# Patient Record
Sex: MALE | Race: WHITE | URBAN - METROPOLITAN AREA
[De-identification: names, ages, dates, MRNs, and addresses within clinical notes are randomized per-mention and may not be internally consistent; named-entity substitution may affect disease eponyms.]

---

## 2020-09-08 ENCOUNTER — LAB OUTSIDE AN ENCOUNTER (OUTPATIENT)
Dept: URBAN - METROPOLITAN AREA CLINIC 121 | Facility: CLINIC | Age: 35
End: 2020-09-08

## 2020-09-09 ENCOUNTER — OFFICE VISIT (OUTPATIENT)
Dept: URBAN - METROPOLITAN AREA CLINIC 63 | Facility: CLINIC | Age: 35
End: 2020-09-09

## 2020-09-16 LAB
CALPROTECTIN, STOOL: (no result)
CAMPYLOBACTER, CULTURE: (no result)
CLOSTRIDIUM DIFFICILE TOXIN/GDH W/REFL TO: (no result)
FECAL LEUKOCYTE STAIN: (no result)
GIARDIA AG, EIA, STOOL: (no result)
LACTOFERRIN, QN, STOOL: (no result)
OVA AND PARASITES, CONC AND PERM SMEAR: (no result)
SALMONELLA AND SHIGELLA, CULTURE: (no result)
SHIGA TOXINS, EIA W/RFL TO E.COLI O157: (no result)

## 2020-10-09 ENCOUNTER — OFFICE VISIT (OUTPATIENT)
Dept: URBAN - METROPOLITAN AREA CLINIC 63 | Facility: CLINIC | Age: 35
End: 2020-10-09

## 2020-12-10 ENCOUNTER — OFFICE VISIT (OUTPATIENT)
Dept: URBAN - METROPOLITAN AREA CLINIC 60 | Facility: CLINIC | Age: 35
End: 2020-12-10

## 2021-02-10 ENCOUNTER — LAB OUTSIDE AN ENCOUNTER (OUTPATIENT)
Dept: URBAN - METROPOLITAN AREA CLINIC 121 | Facility: CLINIC | Age: 36
End: 2021-02-10

## 2021-02-13 LAB — MAGNESIUM: (no result)

## 2021-02-15 LAB
ABSOLUTE BASOPHILS: (no result)
ABSOLUTE EOSINOPHILS: (no result)
ABSOLUTE LYMPHOCYTES: (no result)
ABSOLUTE MONOCYTES: (no result)
ABSOLUTE NEUTROPHILS: (no result)
ALBUMIN/GLOBULIN RATIO: (no result)
ALBUMIN: (no result)
ALKALINE PHOSPHATASE: (no result)
ALT: (no result)
AST: (no result)
BASOPHILS: (no result)
BILIRUBIN, TOTAL: (no result)
BUN/CREATININE RATIO: (no result)
C-REACTIVE PROTEIN: (no result)
CALCIUM: (no result)
CARBON DIOXIDE: (no result)
CHLORIDE: (no result)
CREATININE: (no result)
EGFR AFRICAN AMERICA: (no result)
EGFR NON-AFR. AMERICAN: (no result)
EOSINOPHILS: (no result)
GLOBULIN: (no result)
GLUCOSE: (no result)
HEMATOCRIT: (no result)
HEMOGLOBIN: (no result)
LYMPHOCYTES: (no result)
MCH: (no result)
MCHC: (no result)
MCV: (no result)
MONOCYTES: (no result)
MPV: (no result)
NEUTROPHILS: (no result)
PLATELET COUNT: (no result)
POTASSIUM: (no result)
PROTEIN, TOTAL: (no result)
RDW: (no result)
RED BLOOD CELL COUNT: (no result)
SODIUM: (no result)
UREA NITROGEN (BUN): (no result)
WHITE BLOOD CELL COUNT: (no result)

## 2021-02-18 ENCOUNTER — LAB OUTSIDE AN ENCOUNTER (OUTPATIENT)
Dept: URBAN - METROPOLITAN AREA CLINIC 121 | Facility: CLINIC | Age: 36
End: 2021-02-18

## 2021-02-24 LAB — CALPROTECTIN, STOOL: (no result)

## 2021-07-19 ENCOUNTER — LAB OUTSIDE AN ENCOUNTER (OUTPATIENT)
Dept: URBAN - METROPOLITAN AREA CLINIC 121 | Facility: CLINIC | Age: 36
End: 2021-07-19

## 2021-07-20 ENCOUNTER — LAB OUTSIDE AN ENCOUNTER (OUTPATIENT)
Dept: URBAN - METROPOLITAN AREA CLINIC 121 | Facility: CLINIC | Age: 36
End: 2021-07-20

## 2021-07-20 LAB — FECAL GLOBIN BY IMMUNOCHEMISTRY: (no result)

## 2021-07-23 LAB
CAMPYLOBACTER, CULTURE: (no result)
FECAL LEUKOCYTE STAIN: (no result)
GIARDIA AG, EIA, STOOL: (no result)
OVA AND PARASITES, CONC AND PERM SMEAR: (no result)
SALMONELLA AND SHIGELLA, CULTURE: (no result)
SHIGA TOXINS, EIA W/RFL TO E.COLI O157: (no result)

## 2021-07-26 LAB
CALPROTECTIN, STOOL: (no result)
CLOSTRIDIUM DIFFICILE TOXIN/GDH W/REFL TO: (no result)
LACTOFERRIN, QN, STOOL: (no result)

## 2021-07-27 LAB
ABSOLUTE BASOPHILS: (no result)
ABSOLUTE EOSINOPHILS: (no result)
ABSOLUTE LYMPHOCYTES: (no result)
ABSOLUTE MONOCYTES: (no result)
ABSOLUTE NEUTROPHILS: (no result)
ADALIMUMAB AB, IBD: (no result)
ADALIMUMAB LEVEL, IBD: (no result)
ALBUMIN/GLOBULIN RATIO: (no result)
ALBUMIN: (no result)
ALKALINE PHOSPHATASE: (no result)
ALT: (no result)
AST: (no result)
BASOPHILS: (no result)
BILIRUBIN, TOTAL: (no result)
BUN/CREATININE RATIO: (no result)
C-REACTIVE PROTEIN: (no result)
CALCIUM: (no result)
CARBON DIOXIDE: (no result)
CHLORIDE: (no result)
COMMENT: (no result)
CREATININE: (no result)
EGFR AFRICAN AMERIC: (no result)
EGFR NON-AFR. AMERI: (no result)
EOSINOPHILS: (no result)
GLOBULIN: (no result)
GLUCOSE: (no result)
HEMATOCRIT: (no result)
HEMOGLOBIN: (no result)
HEPATITIS B SURFACE ANTIGEN: (no result)
INTERPRETATION: (no result)
LYMPHOCYTES: (no result)
MCH: (no result)
MCHC: (no result)
MCV: (no result)
MITOGEN-NIL: (no result)
MONOCYTES: (no result)
MPV: (no result)
NEUTROPHILS: (no result)
NIL: (no result)
PLATELET COUNT: (no result)
POTASSIUM: (no result)
PROTEIN, TOTAL: (no result)
QUANTIFERON(R)-TB GOLD PLUS, 1 TUBE: NEGATIVE
RDW: (no result)
RED BLOOD CELL COUNT: (no result)
SODIUM: (no result)
TB1-NIL: (no result)
TB2-NIL: (no result)
UREA NITROGEN (BUN): (no result)
WHITE BLOOD CELL COUNT: (no result)

## 2021-08-11 ENCOUNTER — OFFICE VISIT (OUTPATIENT)
Dept: URBAN - METROPOLITAN AREA CLINIC 60 | Facility: CLINIC | Age: 36
End: 2021-08-11

## 2021-09-01 ENCOUNTER — OFFICE VISIT (OUTPATIENT)
Dept: URBAN - METROPOLITAN AREA CLINIC 60 | Facility: CLINIC | Age: 36
End: 2021-09-01

## 2021-09-29 ENCOUNTER — OFFICE VISIT (OUTPATIENT)
Dept: URBAN - METROPOLITAN AREA CLINIC 60 | Facility: CLINIC | Age: 36
End: 2021-09-29

## 2021-11-18 ENCOUNTER — OFFICE VISIT (OUTPATIENT)
Dept: URBAN - METROPOLITAN AREA CLINIC 60 | Facility: CLINIC | Age: 36
End: 2021-11-18

## 2021-12-01 ENCOUNTER — OFFICE VISIT (OUTPATIENT)
Dept: URBAN - METROPOLITAN AREA CLINIC 60 | Facility: CLINIC | Age: 36
End: 2021-12-01

## 2021-12-09 LAB
6 MMP: (no result)
6 TG: (no result)
ABSOLUTE BASOPHILS: (no result)
ABSOLUTE EOSINOPHILS: (no result)
ABSOLUTE LYMPHOCYTES: (no result)
ABSOLUTE MONOCYTES: (no result)
ABSOLUTE NEUTROPHILS: (no result)
ALBUMIN/GLOBULIN RATIO: (no result)
ALBUMIN: (no result)
ALKALINE PHOSPHATASE: (no result)
ALT: (no result)
AST: (no result)
BASOPHILS: (no result)
BILIRUBIN, TOTAL: (no result)
BUN/CREATININE RATIO: (no result)
C-REACTIVE PROTEIN: (no result)
CALCIUM: (no result)
CARBON DIOXIDE: (no result)
CHLORIDE: (no result)
COMMENT: (no result)
CREATININE: (no result)
EGFR AFRICAN AMERIC: (no result)
EGFR NON-AFR. AMERI: (no result)
EOSINOPHILS: (no result)
GLOBULIN: (no result)
GLUCOSE: (no result)
HEMATOCRIT: (no result)
HEMOGLOBIN: (no result)
INFLIXIMAB AB, IBD: (no result)
INFLIXIMAB LEVEL, IBD: (no result)
INTERPRETATION: (no result)
LYMPHOCYTES: (no result)
MCH: (no result)
MCHC: (no result)
MCV: (no result)
MONOCYTES: (no result)
MPV: (no result)
NEUTROPHILS: (no result)
PLATELET COUNT: (no result)
POTASSIUM: (no result)
PROTEIN, TOTAL: (no result)
RDW: (no result)
RED BLOOD CELL COUNT: (no result)
SODIUM: (no result)
UREA NITROGEN (BUN): (no result)
WHITE BLOOD CELL COUNT: (no result)

## 2022-02-02 ENCOUNTER — OFFICE VISIT (OUTPATIENT)
Dept: URBAN - METROPOLITAN AREA CLINIC 60 | Facility: CLINIC | Age: 37
End: 2022-02-02

## 2022-04-27 LAB
ABSOLUTE BASOPHILS: (no result)
ABSOLUTE EOSINOPHILS: (no result)
ABSOLUTE LYMPHOCYTES: (no result)
ABSOLUTE MONOCYTES: (no result)
ABSOLUTE NEUTROPHILS: (no result)
ALBUMIN/GLOBULIN RATIO: (no result)
ALBUMIN: (no result)
ALKALINE PHOSPHATASE: (no result)
ALT: (no result)
AST: (no result)
BASOPHILS: (no result)
BILIRUBIN, TOTAL: (no result)
BUN/CREATININE RATIO: (no result)
C-REACTIVE PROTEIN: (no result)
CALCIUM: (no result)
CARBON DIOXIDE: (no result)
CHLORIDE: (no result)
CREATININE: (no result)
EGFR AFRICAN AMERIC: (no result)
EGFR NON-AFR. AMERI: (no result)
EOSINOPHILS: (no result)
GLOBULIN: (no result)
GLUCOSE: (no result)
HEMATOCRIT: (no result)
HEMOGLOBIN: (no result)
LYMPHOCYTES: (no result)
MCH: (no result)
MCHC: (no result)
MCV: (no result)
MONOCYTES: (no result)
MPV: (no result)
NEUTROPHILS: (no result)
PLATELET COUNT: (no result)
POTASSIUM: (no result)
PROTEIN, TOTAL: (no result)
RDW: (no result)
RED BLOOD CELL COUNT: (no result)
SODIUM: (no result)
UREA NITROGEN (BUN): (no result)
VITAMIN D,25-OH,TOTAL,IA: (no result)
WHITE BLOOD CELL COUNT: (no result)

## 2022-07-09 ENCOUNTER — TELEPHONE ENCOUNTER (OUTPATIENT)
Dept: URBAN - METROPOLITAN AREA CLINIC 121 | Facility: CLINIC | Age: 37
End: 2022-07-09

## 2022-07-09 RX ORDER — AZATHIOPRINE 50 MG/1
TABLET ORAL TWICE A DAY
Refills: 0 | OUTPATIENT
Start: 2021-09-26 | End: 2021-09-29

## 2022-07-09 RX ORDER — ADALIMUMAB 40MG/0.4ML
INJECT 1-40MG PEN SC EVERY WEEK KIT SUBCUTANEOUS
Refills: 4 | OUTPATIENT
Start: 2018-10-26 | End: 2019-01-09

## 2022-07-09 RX ORDER — PREDNISONE 20 MG/1
TABLET ORAL
Refills: 0 | OUTPATIENT
Start: 2010-03-31 | End: 2011-07-27

## 2022-07-09 RX ORDER — ADALIMUMAB 40MG/0.8ML
SPOKE WITH CROMARTIE KIT SUBCUTANEOUS TAKE AS DIRECTED
Refills: 3 | OUTPATIENT
Start: 2017-11-28 | End: 2018-02-28

## 2022-07-09 RX ORDER — PREDNISONE 5 MG/1
25MG FOR 7 DAYS, 20MG FOR 7 DAYS, 15 MG FOR 7 DAYS, 10 MG FOR 7 DAYS TABLET ORAL
Refills: 0 | OUTPATIENT
Start: 2021-09-01 | End: 2021-09-29

## 2022-07-09 RX ORDER — ADALIMUMAB 40MG/0.4ML
INJECT 40 MG UNDER THE SKIN EVERY OTHER WEEK AS DIRECTED KIT SUBCUTANEOUS
Refills: 5 | OUTPATIENT
Start: 2021-07-27 | End: 2021-07-27

## 2022-07-09 RX ORDER — ADALIMUMAB 40MG/0.4ML
USE AS DIRECTED40 MG SC INJECTION EVERY OTHER WEEK KIT SUBCUTANEOUS
Refills: 0 | OUTPATIENT
Start: 2021-02-16 | End: 2021-03-31

## 2022-07-09 RX ORDER — AZATHIOPRINE 50 MG/1
2 ONCE A DAY TABLET ORAL
Refills: 0 | OUTPATIENT
Start: 2021-07-28 | End: 2021-08-11

## 2022-07-09 RX ORDER — AZATHIOPRINE 50 MG/1
2 ONCE A DAY TABLET ORAL
Refills: 3 | OUTPATIENT
Start: 2019-03-15 | End: 2019-07-15

## 2022-07-09 RX ORDER — ADALIMUMAB 40MG/0.4ML
INJECT 40 MG UNDER THE SKIN EVERY OTHER WEEK AS DIRECTED KIT SUBCUTANEOUS
Refills: 5 | OUTPATIENT
Start: 2021-07-23 | End: 2021-07-27

## 2022-07-09 RX ORDER — ADALIMUMAB 40MG/0.8ML
INJECT ONE 40MG/0.8ML PEN SYRINGE SC Q WEEK KIT SUBCUTANEOUS TAKE AS DIRECTED
Refills: 3 | OUTPATIENT
Start: 2019-07-05 | End: 2019-07-18

## 2022-07-09 RX ORDER — ADALIMUMAB 40MG/0.8ML
KIT SUBCUTANEOUS TAKE AS DIRECTED
Refills: 3 | OUTPATIENT
Start: 2018-02-28 | End: 2018-08-29

## 2022-07-09 RX ORDER — ADALIMUMAB 40MG/0.8ML
KIT SUBCUTANEOUS TAKE AS DIRECTED
Refills: 3 | OUTPATIENT
Start: 2017-11-16 | End: 2017-11-28

## 2022-07-09 RX ORDER — OSELTAMIVIR PHOSPHATE 75 MG
TWICE A DAY CAPSULE ORAL TWICE A DAY
Refills: 0 | OUTPATIENT
Start: 2018-01-17 | End: 2018-10-12

## 2022-07-09 RX ORDER — AZATHIOPRINE 50 MG/1
2 ONCE A DAY TABLET ORAL
Refills: 1 | OUTPATIENT
Start: 2021-09-29 | End: 2022-04-13

## 2022-07-09 RX ORDER — ADALIMUMAB 40MG/0.4ML
INJECT ONE 40MG/0.4ML PEN SC EVERY 14 DAYS KIT SUBCUTANEOUS TAKE AS DIRECTED
Refills: 5 | OUTPATIENT
Start: 2020-09-23 | End: 2021-02-16

## 2022-07-09 RX ORDER — ADALIMUMAB 40MG/0.4ML
INJECT ONE 40MG/0.4ML PEN SC EVERY 14 DAYS KIT SUBCUTANEOUS TAKE AS DIRECTED
Refills: 5 | OUTPATIENT
Start: 2020-05-04 | End: 2020-10-09

## 2022-07-09 RX ORDER — INFLIXIMAB 100 MG/10ML
INJECTION, POWDER, LYOPHILIZED, FOR SOLUTION INTRAVENOUS
Refills: 0 | OUTPATIENT
Start: 2011-07-27 | End: 2014-05-07

## 2022-07-09 RX ORDER — ADALIMUMAB 40MG/0.8ML
KIT SUBCUTANEOUS
Refills: 0 | OUTPATIENT
Start: 2016-04-13 | End: 2017-01-11

## 2022-07-09 RX ORDER — DICYCLOMINE HYDROCHLORIDE 20 MG/1
FOUR TIMES A DAY TABLET ORAL
Refills: 1 | OUTPATIENT
Start: 2020-09-08 | End: 2020-10-09

## 2022-07-09 RX ORDER — ADALIMUMAB 40MG/0.4ML
INJECT 1-40MG PEN SC EVERY 14 DAYS KIT SUBCUTANEOUS TAKE AS DIRECTED
Refills: 4 | OUTPATIENT
Start: 2018-08-30 | End: 2018-09-26

## 2022-07-09 RX ORDER — ONDANSETRON HYDROCHLORIDE 4 MG/1
THREE TIMES A DAY AS NEEDED FOR NAUSEA TABLET, FILM COATED ORAL THREE TIMES A DAY
Refills: 0 | OUTPATIENT
Start: 2020-09-08 | End: 2020-10-09

## 2022-07-09 RX ORDER — BUDESONIDE 9 MG/1
ONCE A DAY TABLET, EXTENDED RELEASE ORAL ONCE A DAY
Refills: 0 | OUTPATIENT
Start: 2018-12-18 | End: 2019-07-15

## 2022-07-09 RX ORDER — ADALIMUMAB 40MG/0.8ML
CALLED IN TO LEANNE AT PRIME THERAPEUTICS SPECIALTY KIT SUBCUTANEOUS TAKE AS DIRECTED
Refills: 3 | OUTPATIENT
Start: 2017-01-11 | End: 2017-11-16

## 2022-07-09 RX ORDER — ADALIMUMAB 40MG/0.4ML
INJECT ONE 40MG/0.4ML PEN SC Q 7 DAYS KIT SUBCUTANEOUS TAKE AS DIRECTED
Refills: 3 | OUTPATIENT
Start: 2019-07-18 | End: 2021-12-01

## 2022-07-09 RX ORDER — ADALIMUMAB 40MG/0.8ML
INJECT 1 PEN UNDER THE SKIN (SUBCUTANEOUS INJECTION) EVERY 2 WEEKS KIT SUBCUTANEOUS
Refills: 2 | OUTPATIENT
Start: 2018-08-29 | End: 2018-08-30

## 2022-07-09 RX ORDER — ADALIMUMAB 40MG/0.4ML
INJECT 1-40MG PEN SC EVERY WEEK KIT SUBCUTANEOUS
Refills: 4 | OUTPATIENT
Start: 2019-01-09 | End: 2021-12-01

## 2022-07-09 RX ORDER — ADALIMUMAB 40MG/0.4ML
INJECT 1-40MG PEN SC EVERY 14 DAYS KIT SUBCUTANEOUS TAKE AS DIRECTED
Refills: 4 | OUTPATIENT
Start: 2018-09-26 | End: 2018-10-26

## 2022-07-09 RX ORDER — PREDNISONE 10 MG/1
4 TAB/DAY FOR 14 DAYS, THEN 3 TAB//DAY FOR 14 DAYS TABLET ORAL
Refills: 0 | OUTPATIENT
Start: 2020-09-15 | End: 2020-12-10

## 2022-07-09 RX ORDER — PREDNISONE 5 MG/1
2 TABS FOR 14 DAYS, 1 AND 1/2  TAB FOR 7 DAYS, 1 TAB FOR 7 DAYS, 1/2 TABS 7 DAYS TABLET ORAL
Refills: 0 | OUTPATIENT
Start: 2021-09-29 | End: 2021-12-01

## 2022-07-09 RX ORDER — ADALIMUMAB 40MG/0.4ML
INJECT ONE 40MG/0.4ML PEN SC EVERY 14 DAYS KIT SUBCUTANEOUS TAKE AS DIRECTED
Refills: 5 | OUTPATIENT
Start: 2019-07-15 | End: 2019-07-15

## 2022-07-09 RX ORDER — ADALIMUMAB 40MG/0.4ML
INJECT 40 MG UNDER THE SKIN EVERY OTHER WEEK AS DIRECTED KIT SUBCUTANEOUS
Refills: 5 | OUTPATIENT
Start: 2021-06-15 | End: 2021-07-23

## 2022-07-09 RX ORDER — BUDESONIDE 9 MG/1
ONCE A DAY TABLET, EXTENDED RELEASE ORAL ONCE A DAY
Refills: 0 | OUTPATIENT
Start: 2018-10-24 | End: 2018-11-08

## 2022-07-09 RX ORDER — AZATHIOPRINE 50 MG/1
2 ONCE A DAY TABLET ORAL
Refills: 0 | OUTPATIENT
Start: 2021-08-11 | End: 2021-09-01

## 2022-07-09 RX ORDER — INFLIXIMAB 100 MG/10ML
INJECTION, POWDER, LYOPHILIZED, FOR SOLUTION INTRAVENOUS
Refills: 0 | OUTPATIENT
Start: 2014-05-07 | End: 2014-08-20

## 2022-07-09 RX ORDER — PREDNISONE 10 MG/1
TAKE 40 MG X 10D, 30MG X 10D, 20 MG X 10D, 10 MG X 10D THEN 1 TAB EOD UNTIL COMPLETED TABLET ORAL TAKE AS DIRECTED
Refills: 0 | OUTPATIENT
Start: 2021-07-26 | End: 2021-09-01

## 2022-07-09 RX ORDER — ADALIMUMAB 40MG/0.8ML
USE AS DIRECTED.  40MG SUB Q EVERY 2 WEEKS KIT SUBCUTANEOUS
Refills: 6 | OUTPATIENT
Start: 2015-11-03 | End: 2015-11-03

## 2022-07-09 RX ORDER — PREDNISONE 10 MG/1
2 ONCE A DAY FOR 1 WEEK, 1 ONCE A DAY FOR 1 WEEK TABLET ORAL
Refills: 0 | OUTPATIENT
Start: 2020-10-09 | End: 2020-12-10

## 2022-07-09 RX ORDER — ADALIMUMAB 40MG/0.4ML
USE AS DIRECTED40 MG SC INJECTION EVERY WEEK KIT SUBCUTANEOUS
Refills: 5 | OUTPATIENT
Start: 2021-07-27 | End: 2021-09-01

## 2022-07-09 RX ORDER — AZATHIOPRINE 50 MG/1
TABLET ORAL TWICE A DAY
Refills: 0 | OUTPATIENT
Start: 2021-09-29 | End: 2021-09-29

## 2022-07-09 RX ORDER — PREDNISONE 10 MG/1
4 TABS PER DAY FOR A WEEK, THEN 3 TABS PER DAY FOR 21 DAYS TABLET ORAL
Refills: 0 | OUTPATIENT
Start: 2021-08-11 | End: 2021-09-29

## 2022-07-09 RX ORDER — ADALIMUMAB 40MG/0.4ML
INJECT ONE 40MG/0.4ML PEN SC EVERY 14 DAYS KIT SUBCUTANEOUS TAKE AS DIRECTED
Refills: 5 | OUTPATIENT
Start: 2019-12-10 | End: 2021-12-01

## 2022-07-09 RX ORDER — ADALIMUMAB 40MG/0.8ML
USE AS DIRECTED.  40MG SUB Q EVERY 2 WEEKS KIT SUBCUTANEOUS
Refills: 6 | OUTPATIENT
Start: 2015-11-03 | End: 2016-04-13

## 2022-07-09 RX ORDER — PREDNISONE 10 MG/1
10 MG PER DAY FOR 2 WEEKS, THEN ONE HALF TABLET PER DAY FOR TWO WEEKS, THEN OFF TABLET ORAL TAKE AS DIRECTED
Refills: 1 | OUTPATIENT
Start: 2010-04-21 | End: 2017-01-11

## 2022-07-09 RX ORDER — ADALIMUMAB 40MG/0.4ML
USE AS DIRECTED40 MG SC INJECTION EVERY OTHER WEEK KIT SUBCUTANEOUS
Refills: 0 | OUTPATIENT
Start: 2021-03-31 | End: 2021-06-15

## 2022-07-10 ENCOUNTER — TELEPHONE ENCOUNTER (OUTPATIENT)
Dept: URBAN - METROPOLITAN AREA CLINIC 121 | Facility: CLINIC | Age: 37
End: 2022-07-10

## 2022-07-10 RX ORDER — AZATHIOPRINE 50 MG/1
2 ONCE A DAY TABLET ORAL
Refills: 3 | Status: ACTIVE | COMMUNITY
Start: 2022-04-13

## 2022-08-31 ENCOUNTER — ERX REFILL RESPONSE (OUTPATIENT)
Dept: URBAN - METROPOLITAN AREA CLINIC 63 | Facility: CLINIC | Age: 37
End: 2022-08-31

## 2022-08-31 RX ORDER — AZATHIOPRINE 50 MG/1
2 TABLET ORAL ONCE DAILY
Qty: 60 | Refills: 1 | OUTPATIENT

## 2022-08-31 RX ORDER — AZATHIOPRINE 50 MG/1
2 ONCE A DAY TABLET ORAL
Refills: 3 | OUTPATIENT

## 2022-09-08 ENCOUNTER — OFFICE VISIT (OUTPATIENT)
Dept: URBAN - METROPOLITAN AREA CLINIC 60 | Facility: CLINIC | Age: 37
End: 2022-09-08
Payer: COMMERCIAL

## 2022-09-08 ENCOUNTER — WEB ENCOUNTER (OUTPATIENT)
Dept: URBAN - METROPOLITAN AREA CLINIC 60 | Facility: CLINIC | Age: 37
End: 2022-09-08

## 2022-09-08 VITALS
OXYGEN SATURATION: 97 % | HEART RATE: 77 BPM | WEIGHT: 188.6 LBS | TEMPERATURE: 97.9 F | BODY MASS INDEX: 26.4 KG/M2 | DIASTOLIC BLOOD PRESSURE: 74 MMHG | HEIGHT: 71 IN | SYSTOLIC BLOOD PRESSURE: 120 MMHG

## 2022-09-08 DIAGNOSIS — K50.10 CROHN'S DISEASE OF LARGE INTESTINE WITHOUT COMPLICATIONS: ICD-10-CM

## 2022-09-08 DIAGNOSIS — J34.89 LESION OF NOSE: ICD-10-CM

## 2022-09-08 DIAGNOSIS — K50.913 CROHN'S DISEASE, UNSPECIFIED, WITH FISTULA: ICD-10-CM

## 2022-09-08 PROCEDURE — 99214 OFFICE O/P EST MOD 30 MIN: CPT | Performed by: NURSE PRACTITIONER

## 2022-09-08 RX ORDER — AZATHIOPRINE 50 MG/1
2 TABLET ORAL ONCE DAILY
Qty: 60 | Refills: 1 | Status: ACTIVE | COMMUNITY

## 2022-09-08 RX ORDER — ONDANSETRON HYDROCHLORIDE 4 MG/1
1 TABLET TABLET, FILM COATED ORAL
Qty: 2 | Refills: 0 | OUTPATIENT
Start: 2022-09-08

## 2022-09-08 NOTE — HPI-CROHN'S DISEASE
Last Colonoscopy:  3/2018 Small amount of active inflammation at hepatic flexure and small aphthous ulcers in the cecum. Patches of psuedopolyps throughout the colon. Otherwise no inflammation.  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 Better after remicade infusions  Inflammatory Markers: CRP down to 13.2 2021 crp 13.1  2021 crp 154  calprotectin 3320  Required  biologic testin2021 quant gold and hep b  Risk factors for severe disease:  Young age at diagnosis, fistulas, chronic fistula, weight loss.   IBD surgical history: none  Personal history of cancer: none  Cardiac history: none

## 2022-09-08 NOTE — HPI-HPI
Doing great from a gi perspective. He develops general fatigue and diffuse joint pains 6 weeks from his infusion of remicade.  Has a new lesion on nose, seeing derm next week.

## 2022-09-15 ENCOUNTER — LAB OUTSIDE AN ENCOUNTER (OUTPATIENT)
Dept: URBAN - METROPOLITAN AREA CLINIC 60 | Facility: CLINIC | Age: 37
End: 2022-09-15

## 2022-11-07 ENCOUNTER — OFFICE VISIT (OUTPATIENT)
Dept: URBAN - METROPOLITAN AREA SURGERY CENTER 4 | Facility: SURGERY CENTER | Age: 37
End: 2022-11-07

## 2023-04-11 ENCOUNTER — TELEPHONE ENCOUNTER (OUTPATIENT)
Dept: URBAN - METROPOLITAN AREA CLINIC 63 | Facility: CLINIC | Age: 38
End: 2023-04-11

## 2023-04-17 ENCOUNTER — TELEPHONE ENCOUNTER (OUTPATIENT)
Dept: URBAN - METROPOLITAN AREA CLINIC 60 | Facility: CLINIC | Age: 38
End: 2023-04-17

## 2023-05-05 LAB
A/G RATIO: 1.3
ALBUMIN: 4.3
ALKALINE PHOSPHATASE: 97
ALT (SGPT): 38
AST (SGOT): 25
BILIRUBIN, TOTAL: 0.5
BUN/CREATININE RATIO: (no result)
BUN: 22
C-REACTIVE PROTEIN, QUANT: 2.9
CALCIUM: 9.3
CARBON DIOXIDE, TOTAL: 28
CHLORIDE: 103
CREATININE: 1.13
EGFR: 84
GLOBULIN, TOTAL: 3.3
GLUCOSE: 79
HEMATOCRIT: 40.2
HEMOGLOBIN: 13.3
HEP B CORE AB, IGM: (no result)
HEPATITIS B SURFACE ANTIGEN: (no result)
MCH: 29.2
MCHC: 33.1
MCV: 88.4
MITOGEN-NIL: >10
MPV: 10.4
PLATELET COUNT: 225
POTASSIUM: 4.6
PROTEIN, TOTAL: 7.6
QUANTIFERON NIL VALUE: 0.03
QUANTIFERON TB1 AG VALUE: <0
QUANTIFERON TB2 AG VALUE: <0
QUANTIFERON-TB GOLD PLUS: NEGATIVE
RDW: 14
RED BLOOD CELL COUNT: 4.55
SODIUM: 139
VITAMIN D,25-OH,TOTAL,IA: 26
WHITE BLOOD CELL COUNT: 5.5

## 2023-05-11 ENCOUNTER — OFFICE VISIT (OUTPATIENT)
Dept: URBAN - METROPOLITAN AREA CLINIC 60 | Facility: CLINIC | Age: 38
End: 2023-05-11

## 2023-05-31 ENCOUNTER — WEB ENCOUNTER (OUTPATIENT)
Dept: URBAN - METROPOLITAN AREA CLINIC 60 | Facility: CLINIC | Age: 38
End: 2023-05-31

## 2023-05-31 ENCOUNTER — CLAIMS CREATED FROM THE CLAIM WINDOW (OUTPATIENT)
Dept: URBAN - METROPOLITAN AREA CLINIC 60 | Facility: CLINIC | Age: 38
End: 2023-05-31
Payer: COMMERCIAL

## 2023-05-31 ENCOUNTER — DASHBOARD ENCOUNTERS (OUTPATIENT)
Age: 38
End: 2023-05-31

## 2023-05-31 VITALS
BODY MASS INDEX: 28.28 KG/M2 | SYSTOLIC BLOOD PRESSURE: 118 MMHG | WEIGHT: 202 LBS | TEMPERATURE: 98.9 F | DIASTOLIC BLOOD PRESSURE: 68 MMHG | HEART RATE: 74 BPM | HEIGHT: 71 IN | OXYGEN SATURATION: 97 %

## 2023-05-31 DIAGNOSIS — R53.82 CHRONIC FATIGUE: ICD-10-CM

## 2023-05-31 DIAGNOSIS — K50.10 CROHN'S DISEASE OF LARGE INTESTINE WITHOUT COMPLICATIONS: ICD-10-CM

## 2023-05-31 DIAGNOSIS — K50.913 CROHN'S DISEASE, UNSPECIFIED, WITH FISTULA: ICD-10-CM

## 2023-05-31 DIAGNOSIS — Z85.89 HISTORY OF SQUAMOUS CELL CARCINOMA: ICD-10-CM

## 2023-05-31 DIAGNOSIS — E55.9 VITAMIN D DEFICIENCY: ICD-10-CM

## 2023-05-31 PROBLEM — 84229001: Status: ACTIVE | Noted: 2023-05-31

## 2023-05-31 PROBLEM — 34713006: Status: ACTIVE | Noted: 2023-05-31

## 2023-05-31 PROBLEM — 69551000119109: Status: ACTIVE | Noted: 2023-05-31

## 2023-05-31 PROCEDURE — 99215 OFFICE O/P EST HI 40 MIN: CPT | Performed by: NURSE PRACTITIONER

## 2023-05-31 RX ORDER — SODIUM, POTASSIUM,MAG SULFATES 17.5-3.13G
177ML SOLUTION, RECONSTITUTED, ORAL ORAL
Qty: 1 KIT | Refills: 0 | OUTPATIENT
Start: 2023-05-31 | End: 2023-06-01

## 2023-05-31 RX ORDER — ONDANSETRON HYDROCHLORIDE 4 MG/1
1 TABLET TABLET, FILM COATED ORAL
Qty: 2 | Refills: 0 | OUTPATIENT
Start: 2023-05-31

## 2023-05-31 NOTE — HPI-TODAY'S VISIT:
Clinically doing great. Normal bm every day, formed without bleeding, mucous or urgency.  Only complaint is fatigue. Vit d level low on labs. Wants to know if he should check his testosterone, works out regularly but needs caffeine for energy.

## 2023-05-31 NOTE — HPI-CROHN'S DISEASE
Last Colonoscopy:  3/2018 Small amount of active inflammation at hepatic flexure and small aphthous ulcers in the cecum. Patches of psuedopolyps throughout the colon. Otherwise no inflammation.  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   IBD surgical history: none  Personal history of cancer: none      Cardiac history: squamous cell cancer on nose

## 2023-06-07 ENCOUNTER — LAB OUTSIDE AN ENCOUNTER (OUTPATIENT)
Dept: URBAN - METROPOLITAN AREA CLINIC 60 | Facility: CLINIC | Age: 38
End: 2023-06-07

## 2023-08-17 ENCOUNTER — TELEPHONE ENCOUNTER (OUTPATIENT)
Dept: URBAN - METROPOLITAN AREA CLINIC 60 | Facility: CLINIC | Age: 38
End: 2023-08-17

## 2023-12-19 ENCOUNTER — OFFICE VISIT (OUTPATIENT)
Dept: URBAN - METROPOLITAN AREA SURGERY CENTER 4 | Facility: SURGERY CENTER | Age: 38
End: 2023-12-19

## 2024-01-08 ENCOUNTER — OFFICE VISIT (OUTPATIENT)
Dept: URBAN - METROPOLITAN AREA SURGERY CENTER 4 | Facility: SURGERY CENTER | Age: 39
End: 2024-01-08

## 2024-01-29 ENCOUNTER — TELEPHONE ENCOUNTER (OUTPATIENT)
Dept: URBAN - METROPOLITAN AREA CLINIC 60 | Facility: CLINIC | Age: 39
End: 2024-01-29

## 2024-01-31 ENCOUNTER — CLAIMS CREATED FROM THE CLAIM WINDOW (OUTPATIENT)
Dept: URBAN - METROPOLITAN AREA SURGERY CENTER 4 | Facility: SURGERY CENTER | Age: 39
End: 2024-01-31
Payer: COMMERCIAL

## 2024-01-31 DIAGNOSIS — K64.1 SECOND DEGREE HEMORRHOIDS: ICD-10-CM

## 2024-01-31 DIAGNOSIS — C18.5: ICD-10-CM

## 2024-01-31 DIAGNOSIS — K50.112 CROHN'S DISEASE OF LARGE INTESTINE WITH INTESTINAL OBSTRUCTION: ICD-10-CM

## 2024-01-31 DIAGNOSIS — Z09 ENCNTR FOR F/U EXAM AFT TRTMT FOR COND OTH THAN MALIG NEOPLM: ICD-10-CM

## 2024-01-31 DIAGNOSIS — K64.1 INTERNAL HEMORRHOIDS GRADE II: ICD-10-CM

## 2024-01-31 DIAGNOSIS — K50.10 CROHN'S COLITIS: ICD-10-CM

## 2024-01-31 PROCEDURE — 00811 ANES LWR INTST NDSC NOS: CPT | Performed by: NURSE ANESTHETIST, CERTIFIED REGISTERED

## 2024-01-31 PROCEDURE — 45380 COLONOSCOPY AND BIOPSY: CPT | Performed by: INTERNAL MEDICINE

## 2024-01-31 RX ORDER — ONDANSETRON HYDROCHLORIDE 4 MG/1
1 TABLET TABLET, FILM COATED ORAL
Qty: 2 | Refills: 0 | Status: ACTIVE | COMMUNITY
Start: 2023-05-31

## 2024-06-05 ENCOUNTER — OFFICE VISIT (OUTPATIENT)
Dept: URBAN - METROPOLITAN AREA CLINIC 60 | Facility: CLINIC | Age: 39
End: 2024-06-05
Payer: COMMERCIAL

## 2024-06-05 VITALS
BODY MASS INDEX: 28.48 KG/M2 | WEIGHT: 203.4 LBS | SYSTOLIC BLOOD PRESSURE: 128 MMHG | TEMPERATURE: 98.8 F | OXYGEN SATURATION: 99 % | HEIGHT: 71 IN | HEART RATE: 66 BPM | RESPIRATION RATE: 12 BRPM | DIASTOLIC BLOOD PRESSURE: 76 MMHG

## 2024-06-05 DIAGNOSIS — K50.10 CROHN'S DISEASE OF LARGE INTESTINE WITHOUT COMPLICATION: ICD-10-CM

## 2024-06-05 PROBLEM — 7620006: Status: ACTIVE | Noted: 2024-06-05

## 2024-06-05 PROCEDURE — 99214 OFFICE O/P EST MOD 30 MIN: CPT

## 2024-06-05 NOTE — HPI-PREVIOUS LABS
CBC:WBC 8.9, RBC 5.69, hemoglobin 17.5, MCV 89.5, platelet 352 BMP:Sodium 139, creatinine 1.41, GFR over 60

## 2024-06-05 NOTE — HPI-PREVIOUS PROCEDURES
Colonoscopy 1/31/2024 normal ileum, total score of 4 suggesting mild Crohn's disease, benign-appearing intrinsic severe stenosis measuring 2 cm x 1 cm found at splenic flexure and had to use gastroscope that traversed area with minimal resistance, rectum, rectosigmoid colon, sigmoid colon, mid descending colon and distal descending colon appeared normal and nonbleeding external and internal hemorrhoids. Repeat 2 years. Transverse colon biopsy with benign colonic mucosa without significant inflammation, splenic flexure colon polyp was inflammatory polyp, sigmoid/descending colon biopsy without significant distortion or inflammation in rectum biopsy without significant distortion or inflammation. -- 3/2018 Small amount of active inflammation at hepatic flexure and small aphthous ulcers in the cecum. Patches of psuedopolyps throughout the colon. Otherwise no inflammation.

## 2024-06-05 NOTE — HPI-CROHN'S DISEASE
Last Colonoscopy: Colonoscopy 2024 normal ileum, total score of 4 suggesting mild Crohn's disease, benign-appearing intrinsic severe stenosis measuring 2 cm x 1 cm found at splenic flexure and had to use gastroscope that traversed area with minimal resistance Repeat 2 years.    Current IBD Meds: now on Remicade 10mg/kg every 6 weeks for maintenance  Prior IBD Meds: Remicade 5mg/kg IV q 8 weeks, humira weekly, azathioprine 100mg  Steroid use/response: Responded quickly to 40mg of prednisone but no steroids in more than a year  Extraintestinal manifestations: diffuse joint pains, knees, elbows, ankles and hands resolved with increasing remicade frequency  Inflammatory Markers: 2023 CRP down to 2.9 2021 crp 13.1  2021 crp 154  calprotectin 3320  Required  biologic testin2023 quant gold and hep b  Risk factors for severe disease:  Young age at diagnosis, fistulas, chronic fistula, weight loss.   IBD surgical history: none  Personal history of cancer: none      Cardiac history: squamous cell cancer on nose  Patient is doing well. Having 1 formed bristol type 4 stool per day without signs of bleeding or urgency. Compliant with his Remicade 10mg/kg q 6 weeks. Patient relays he is upset about how the doctor spoke with him following colonoscopy, how it took 6 months to get a follow up and how hard it is to get through the phone system. We discuss the findings in detail. Recall in 2 years. Denies any other GI complaints.

## 2024-12-10 ENCOUNTER — TELEPHONE ENCOUNTER (OUTPATIENT)
Dept: URBAN - METROPOLITAN AREA CLINIC 60 | Facility: CLINIC | Age: 39
End: 2024-12-10

## 2025-03-26 ENCOUNTER — TELEPHONE ENCOUNTER (OUTPATIENT)
Dept: URBAN - METROPOLITAN AREA CLINIC 60 | Facility: CLINIC | Age: 40
End: 2025-03-26

## 2025-03-28 ENCOUNTER — OFFICE VISIT (OUTPATIENT)
Dept: URBAN - METROPOLITAN AREA CLINIC 60 | Facility: CLINIC | Age: 40
End: 2025-03-28
Payer: COMMERCIAL

## 2025-03-28 ENCOUNTER — TELEPHONE ENCOUNTER (OUTPATIENT)
Dept: URBAN - METROPOLITAN AREA CLINIC 63 | Facility: CLINIC | Age: 40
End: 2025-03-28

## 2025-03-28 DIAGNOSIS — K50.10 CROHN'S DISEASE OF LARGE INTESTINE WITHOUT COMPLICATIONS: ICD-10-CM

## 2025-03-28 PROCEDURE — 99214 OFFICE O/P EST MOD 30 MIN: CPT

## 2025-03-28 NOTE — HPI-TODAY'S VISIT:
Patient is a 40 yo male last seen by me 6/2024 for history of Crohn's disease diagnosed 2010. Previously on Humira which failed as well as Remicade 5mg/kg every 8 weeks.  Patient doing well.  Having 1-2 formed BMs per day without bleeding or urgency.  Compliant with Remicade 10 mg per cake every 6 weeks.  Repeat colonoscopy due 1/2026.  Patient needs refills of Remicade sent to [facility] where he gets his infusions.  He unfortunately did not complete the assigned labs at last visit so we will reorder them for him to complete at this time.

## 2025-03-28 NOTE — HPI-CROHN'S DISEASE
Last Colonoscopy: Colonoscopy 2024 normal ileum, total score of 4 suggesting mild Crohn's disease, benign-appearing intrinsic severe stenosis measuring 2 cm x 1 cm found at splenic flexure and had to use gastroscope that traversed area with minimal resistance Repeat 2 years.    Current IBD Meds: now on Remicade 10mg/kg every 6 weeks for maintenance  Prior IBD Meds: Remicade 5mg/kg IV q 8 weeks, humira weekly, azathioprine 100mg  Steroid use/response: Responded quickly to 40mg of prednisone but no steroids in more than a year  Extraintestinal manifestations: diffuse joint pains, knees, elbows, ankles and hands resolved with increasing remicade frequency  Inflammatory Markers: 2023 CRP down to 2.9 2021 crp 13.1  2021 crp 154  calprotectin 3320  Required  biologic testin2023 quant gold and hep b  Risk factors for severe disease:  Young age at diagnosis, fistulas, chronic fistula, weight loss.   IBD surgical history: none  Personal history of cancer: none      Cardiac history: squamous cell cancer on nose

## 2025-07-14 ENCOUNTER — TELEPHONE ENCOUNTER (OUTPATIENT)
Dept: URBAN - METROPOLITAN AREA CLINIC 60 | Facility: CLINIC | Age: 40
End: 2025-07-14